# Patient Record
Sex: FEMALE | Race: WHITE | NOT HISPANIC OR LATINO | URBAN - METROPOLITAN AREA
[De-identification: names, ages, dates, MRNs, and addresses within clinical notes are randomized per-mention and may not be internally consistent; named-entity substitution may affect disease eponyms.]

---

## 2017-05-03 ENCOUNTER — EMERGENCY (EMERGENCY)
Facility: HOSPITAL | Age: 37
LOS: 1 days | Discharge: HOME | End: 2017-05-03
Admitting: EMERGENCY MEDICINE
Payer: MEDICAID

## 2017-05-03 VITALS
OXYGEN SATURATION: 100 % | HEART RATE: 102 BPM | RESPIRATION RATE: 16 BRPM | SYSTOLIC BLOOD PRESSURE: 109 MMHG | TEMPERATURE: 98 F | DIASTOLIC BLOOD PRESSURE: 70 MMHG

## 2017-05-03 DIAGNOSIS — Z98.891 HISTORY OF UTERINE SCAR FROM PREVIOUS SURGERY: Chronic | ICD-10-CM

## 2017-05-03 PROCEDURE — 73564 X-RAY EXAM KNEE 4 OR MORE: CPT | Mod: 26,RT

## 2017-05-03 PROCEDURE — 99285 EMERGENCY DEPT VISIT HI MDM: CPT

## 2017-05-03 NOTE — ED PROVIDER NOTE - PLAN OF CARE
Rest, Ice, Elevate and use ace wrap on affected extremity.  Percocet 1 tablet every 6 hrs as needed for breakthrough discomfort- caution drowsiness while taking this medication- do not drive or operate heavy machinery. Take Ibuprofen 600mg every 6 hours for mild-moderate pain as needed.  Follow up with orthopedist within 1 week for further evaluation - refer to list provided.  Follow up with your primary care physician within 1 week for post hospital visit.  Return to the Emergency Department for any new, worsening or concerning symptoms. Rest, Ice, Elevate and use ace wrap on affected extremity. Avoid strenuous activity/exercise.  Percocet 1 tablet every 6 hrs as needed for breakthrough discomfort- caution drowsiness while taking this medication- do not drive or operate heavy machinery. Take Tylenol 650mg every 4 hours for mild-moderate pain as needed.  Follow up with orthopedist within 1 week for further evaluation - refer to list provided.  Follow up with your primary care physician within 1 week for post hospital visit.  Return to the Emergency Department for any new, worsening or concerning symptoms.

## 2017-05-03 NOTE — ED PROVIDER NOTE - CARE PLAN
Principal Discharge DX:	Knee pain  Instructions for follow-up, activity and diet:	Rest, Ice, Elevate and use ace wrap on affected extremity.  Percocet 1 tablet every 6 hrs as needed for breakthrough discomfort- caution drowsiness while taking this medication- do not drive or operate heavy machinery. Take Ibuprofen 600mg every 6 hours for mild-moderate pain as needed.  Follow up with orthopedist within 1 week for further evaluation - refer to list provided.  Follow up with your primary care physician within 1 week for post hospital visit.  Return to the Emergency Department for any new, worsening or concerning symptoms. Principal Discharge DX:	Knee pain  Instructions for follow-up, activity and diet:	Rest, Ice, Elevate and use ace wrap on affected extremity. Avoid strenuous activity/exercise.  Percocet 1 tablet every 6 hrs as needed for breakthrough discomfort- caution drowsiness while taking this medication- do not drive or operate heavy machinery. Take Tylenol 650mg every 4 hours for mild-moderate pain as needed.  Follow up with orthopedist within 1 week for further evaluation - refer to list provided.  Follow up with your primary care physician within 1 week for post hospital visit.  Return to the Emergency Department for any new, worsening or concerning symptoms.

## 2017-05-03 NOTE — ED PROVIDER NOTE - OBJECTIVE STATEMENT
36 year old female with PMHx of chronic sinusitis, nasal polyps and seasonal allergies pw right sided knee pain after 2 rounds of Levaquin and high endurance exercise. Pt states that the pain in the right knee occurred 1.5 months ago and was exacerbated 2 weeks ago after exercise. Pain is on the medial anterior portion of the knee, pulling/burning in nature, 8/10 in severity, aggravated with movement. pt able to bear weight on both legs with no assistance. Denies n/v/f/c, CP, SIB, abdominal pain, urinary symptoms, numbness/tingling/weakness in extremities.

## 2017-05-03 NOTE — ED ADULT TRIAGE NOTE - CHIEF COMPLAINT QUOTE
Pt arrives w/ c/o burning sensation and pop to right knee for 2 weeks. Denies any trauma to area. Ambulatory to exercise.

## 2017-05-03 NOTE — ED PROVIDER NOTE - LOWER EXTREMITY EXAM, RIGHT
pain to palpation on the right medial/anterior  aspect of the knee, ROM within normal limits, no deformity, no ligamentous instability

## 2017-05-03 NOTE — ED PROVIDER NOTE - MEDICAL DECISION MAKING DETAILS
36 year old female with PMHx of chronic sinusitis, nasal polyps and seasonal allergies pw right sided knee pain after 2 rounds of Levaquin and high endurance exercise  Plan: xray, pain management

## 2017-05-05 ENCOUNTER — APPOINTMENT (OUTPATIENT)
Dept: ORTHOPEDIC SURGERY | Facility: CLINIC | Age: 37
End: 2017-05-05

## 2017-05-10 ENCOUNTER — APPOINTMENT (OUTPATIENT)
Dept: ORTHOPEDIC SURGERY | Facility: HOSPITAL | Age: 37
End: 2017-05-10

## 2017-05-10 ENCOUNTER — OUTPATIENT (OUTPATIENT)
Dept: OUTPATIENT SERVICES | Facility: HOSPITAL | Age: 37
LOS: 1 days | End: 2017-05-10

## 2017-05-10 VITALS
SYSTOLIC BLOOD PRESSURE: 101 MMHG | HEIGHT: 63 IN | WEIGHT: 293 LBS | BODY MASS INDEX: 51.91 KG/M2 | DIASTOLIC BLOOD PRESSURE: 69 MMHG | HEART RATE: 74 BPM

## 2017-05-10 DIAGNOSIS — Z98.891 HISTORY OF UTERINE SCAR FROM PREVIOUS SURGERY: Chronic | ICD-10-CM

## 2017-05-10 DIAGNOSIS — M25.561 PAIN IN RIGHT KNEE: ICD-10-CM

## 2017-05-11 DIAGNOSIS — M22.41 CHONDROMALACIA PATELLAE, RIGHT KNEE: ICD-10-CM

## 2017-06-20 ENCOUNTER — RESULT REVIEW (OUTPATIENT)
Age: 37
End: 2017-06-20

## 2018-12-18 ENCOUNTER — EMERGENCY (EMERGENCY)
Facility: HOSPITAL | Age: 38
LOS: 1 days | Discharge: HOME | End: 2018-12-18
Attending: EMERGENCY MEDICINE
Payer: MEDICAID

## 2018-12-18 VITALS
RESPIRATION RATE: 18 BRPM | HEART RATE: 98 BPM | TEMPERATURE: 98 F | OXYGEN SATURATION: 97 % | DIASTOLIC BLOOD PRESSURE: 78 MMHG | WEIGHT: 130.07 LBS | HEIGHT: 63 IN | SYSTOLIC BLOOD PRESSURE: 126 MMHG

## 2018-12-18 DIAGNOSIS — Z98.891 HISTORY OF UTERINE SCAR FROM PREVIOUS SURGERY: Chronic | ICD-10-CM

## 2018-12-18 PROCEDURE — 90471 IMMUNIZATION ADMIN: CPT

## 2018-12-18 PROCEDURE — 99283 EMERGENCY DEPT VISIT LOW MDM: CPT | Mod: 25

## 2018-12-18 PROCEDURE — 90715 TDAP VACCINE 7 YRS/> IM: CPT

## 2018-12-18 PROCEDURE — 99284 EMERGENCY DEPT VISIT MOD MDM: CPT

## 2018-12-18 RX ORDER — ACETAMINOPHEN 500 MG
975 TABLET ORAL ONCE
Qty: 0 | Refills: 0 | Status: COMPLETED | OUTPATIENT
Start: 2018-12-18 | End: 2018-12-18

## 2018-12-18 RX ORDER — TETANUS TOXOID, REDUCED DIPHTHERIA TOXOID AND ACELLULAR PERTUSSIS VACCINE, ADSORBED 5; 2.5; 8; 8; 2.5 [IU]/.5ML; [IU]/.5ML; UG/.5ML; UG/.5ML; UG/.5ML
0.5 SUSPENSION INTRAMUSCULAR ONCE
Qty: 0 | Refills: 0 | Status: COMPLETED | OUTPATIENT
Start: 2018-12-18 | End: 2018-12-18

## 2018-12-18 RX ADMIN — Medication 975 MILLIGRAM(S): at 22:48

## 2018-12-18 RX ADMIN — TETANUS TOXOID, REDUCED DIPHTHERIA TOXOID AND ACELLULAR PERTUSSIS VACCINE, ADSORBED 0.5 MILLILITER(S): 5; 2.5; 8; 8; 2.5 SUSPENSION INTRAMUSCULAR at 22:48

## 2018-12-18 NOTE — ED PROVIDER NOTE - NSFOLLOWUPINSTRUCTIONS_ED_ALL_ED_FT
Rest, increase activity as tolerated- no gym or working out  for at least one week  Eat lightly- avoid alcohol or sedatives  REturn to the ER for any concerns  Take tylenol for pain as needed every 6 hrs  Follow up with your primary care provider

## 2018-12-18 NOTE — ED PROVIDER NOTE - MEDICAL DECISION MAKING DETAILS
s/p head injury while working out at the gum- 30 lb wght fell on head- no  loc no imaging warranted - tdap given  for puncture wound to head- tylenol given

## 2018-12-18 NOTE — ED PROVIDER NOTE - ATTENDING CONTRIBUTION TO CARE
I performed a history and physical exam of the patient and discussed their management with the Advanced Care Practitioner. I reviewed the ACP's note and agree with the documented findings and plan of care, except if noted below. My medical decision making and observations are found below.    39 yo female sp head injury with barbell. Small scalp abrasion. Neuro intact. Tdap, wound care, dc home.

## 2018-12-18 NOTE — ED ADULT NURSE NOTE - OBJECTIVE STATEMENT
Pt presents with head injury, struck head on bar at gym, AXOX3, no LOC, reporting head pain, not on blood thinners.

## 2018-12-18 NOTE — ED PROVIDER NOTE - OBJECTIVE STATEMENT
39 yo female accompanied by her sister presents to the ER for 39 yo female accompanied by her sister presents to the ER for evaluation of head injury. Pt states "I was at the gym working out doing chest presses lying down when another person was doing the same behind me when her 30lb weight fell on my head. I noticed right after that my head was bleeding from a small cut on my head. Mt tetanus is also not up to date and I need it".  Pt denies LOC. Pt reports moderate throbbing headache with some mild nausea.  Pt c/o left sided neck pain with no midline tenderness.

## 2018-12-19 PROBLEM — J32.9 CHRONIC SINUSITIS, UNSPECIFIED: Chronic | Status: ACTIVE | Noted: 2017-05-03

## 2018-12-19 PROBLEM — J33.9 NASAL POLYP, UNSPECIFIED: Chronic | Status: ACTIVE | Noted: 2017-05-03

## 2020-01-10 ENCOUNTER — NON-APPOINTMENT (OUTPATIENT)
Age: 40
End: 2020-01-10

## 2020-01-10 ENCOUNTER — APPOINTMENT (OUTPATIENT)
Dept: PEDIATRIC ALLERGY IMMUNOLOGY | Facility: CLINIC | Age: 40
End: 2020-01-10
Payer: MEDICAID

## 2020-01-10 VITALS
HEART RATE: 98 BPM | DIASTOLIC BLOOD PRESSURE: 66 MMHG | SYSTOLIC BLOOD PRESSURE: 101 MMHG | OXYGEN SATURATION: 97 % | WEIGHT: 128.99 LBS | HEIGHT: 62.99 IN | BODY MASS INDEX: 22.86 KG/M2

## 2020-01-10 DIAGNOSIS — J45.909 UNSPECIFIED ASTHMA, UNCOMPLICATED: ICD-10-CM

## 2020-01-10 DIAGNOSIS — Z78.9 OTHER SPECIFIED HEALTH STATUS: ICD-10-CM

## 2020-01-10 DIAGNOSIS — J33.9 UNSPECIFIED ASTHMA, UNCOMPLICATED: ICD-10-CM

## 2020-01-10 DIAGNOSIS — Z88.6 UNSPECIFIED ASTHMA, UNCOMPLICATED: ICD-10-CM

## 2020-01-10 DIAGNOSIS — J30.9 ALLERGIC RHINITIS, UNSPECIFIED: ICD-10-CM

## 2020-01-10 DIAGNOSIS — Z87.09 PERSONAL HISTORY OF OTHER DISEASES OF THE RESPIRATORY SYSTEM: ICD-10-CM

## 2020-01-10 PROCEDURE — 94060 EVALUATION OF WHEEZING: CPT | Mod: 59

## 2020-01-10 PROCEDURE — 95004 PERQ TESTS W/ALRGNC XTRCS: CPT

## 2020-01-10 PROCEDURE — 99204 OFFICE O/P NEW MOD 45 MIN: CPT | Mod: 25

## 2020-01-10 PROCEDURE — 36415 COLL VENOUS BLD VENIPUNCTURE: CPT

## 2020-01-10 RX ORDER — EPINEPHRINE 0.3 MG/.3ML
0.3 INJECTION INTRAMUSCULAR
Qty: 1 | Refills: 1 | Status: ACTIVE | COMMUNITY
Start: 2020-01-10 | End: 1900-01-01

## 2020-01-10 RX ORDER — MONTELUKAST SODIUM 10 MG/1
10 TABLET, FILM COATED ORAL
Refills: 0 | Status: ACTIVE | COMMUNITY

## 2020-01-10 RX ORDER — FLUTICASONE PROPIONATE 50 MCG
50 SPRAY, SUSPENSION NASAL
Refills: 0 | Status: ACTIVE | COMMUNITY

## 2020-01-12 LAB
BASOPHILS # BLD AUTO: 0.05 K/UL
BASOPHILS NFR BLD AUTO: 0.5 %
EOSINOPHIL # BLD AUTO: 0.95 K/UL
EOSINOPHIL NFR BLD AUTO: 10.2 %
HCT VFR BLD CALC: 41.1 %
HGB BLD-MCNC: 13.1 G/DL
IMM GRANULOCYTES NFR BLD AUTO: 0.3 %
LYMPHOCYTES # BLD AUTO: 2.89 K/UL
LYMPHOCYTES NFR BLD AUTO: 31 %
MAN DIFF?: NORMAL
MCHC RBC-ENTMCNC: 29.1 PG
MCHC RBC-ENTMCNC: 31.9 GM/DL
MCV RBC AUTO: 91.3 FL
MONOCYTES # BLD AUTO: 0.66 K/UL
MONOCYTES NFR BLD AUTO: 7.1 %
NEUTROPHILS # BLD AUTO: 4.75 K/UL
NEUTROPHILS NFR BLD AUTO: 50.9 %
PLATELET # BLD AUTO: 296 K/UL
RBC # BLD: 4.5 M/UL
RBC # FLD: 14.3 %
WBC # FLD AUTO: 9.33 K/UL

## 2020-01-14 NOTE — HISTORY OF PRESENT ILLNESS
[Eczematous rashes] : eczematous rashes [de-identified] : 39 year old female with a diagnosis of [place]'s [place] here for initial visit and allergy testing. \par \par Allergies/ Allergic Rhinitis/ Sinusitis: \par Patient (Pt) has had nasal congestion, rhinorrhea and sneezing for years, could not quantify how many. Symptoms occur through out the year. She is currently taking Flonase 2 sprays daily and nasal saline washes infrequently. She is using Zyrtec daily for 1 year.  She stopped on 1/5/2020 for skin testing today. Pt's last allergy testing was 5 years ago where she was found to be allergic to cats, dogs, dust mites and rodents. Other reactions include to wine where she develops nasal swelling, itchiness and eye tearing. \par \par Associated with this, when she has had milk for the last 5 years, she has lip and throat tingling and itching. She has no other food allergies that she can recall. She has followed with multiple A&I specialists, her last follow up was with Dr. Denise in [place] 1 year ago. She previously was getting allergy shots for 1 year which did not help. Patient gets frequent sinus and ear infections at least 1x/month for the past 3 years requiring multiple antibiotic courses for each episode. \par \par Nasal Polyps:\par She has had nasal polyps since 24 years old with 4 prior surgeries, last one being in June 2018. She previously followed with Dr. Talbot (ENT in [place]) and recently switched to Dr. Douglas (ENT in [place]). Has an appointment of 1/22 where she has a hearing test for possible ear tubes, and evidence of some hearing loss. She has been told that she has chronic fluid in both ears.  \par \par Aspirin Intolerance: \par Diagnosed at 13 years old, Ms. Lancaster took some aspirin and immediately afterwards, developed hives and throat swelling which was treated with antihistamines.  She has avoided aspirin since then.  \par \par Asthma: \par Diagnosed at 10 year ago. Symptoms include wheezing, SOB, cough which she gets daily. She does not have albuterol at home. Uses Symbicort daily however sometimes misses doses. Her asthma triggers include allergies, cold, change in weather. She has never seen a pulmonologist. Her PCP Dr. Marie recently prescribed singulair and symbicort which she has not picked up yet. \par  \par She has no carpets, pets, no smokers in the home. \par \par Medications: \par Zyrtec daily\par Flonase daily\par Symbicort daily (non-compliant with dosing) [Cough] : cough [> or = 2 days/wk] : > than or = 2 days/wk [Minor Limitation] : minor limitation [< or = 15] : < than or = 15  [Daily] : daily

## 2020-01-14 NOTE — IMPRESSION
[Allergy Testing Trees] : trees [Allergy Testing Dust Mite] : dust mites [Allergy Testing Grasses] : grasses [Allergy Testing Cockroach] : cockroach [Allergy Testing Mixed Feathers] : feathers [] : molds [Allergy Testing Cat] : cat [Allergy Testing Dog] : dog [Spirometry] : Spirometry [Moderate] : (moderate) [Reversible] : , without reversibility. [Allergy Testing Weeds] : weeds

## 2020-01-14 NOTE — PHYSICAL EXAM
[Alert] : alert [Well Nourished] : well nourished [Healthy Appearance] : healthy appearance [No Acute Distress] : no acute distress [Well Developed] : well developed [Normal Pupil & Iris Size/Symmetry] : normal pupil and iris size and symmetry [No Discharge] : no discharge [No Photophobia] : no photophobia [Sclera Not Icteric] : sclera not icteric [Normal Lips/Tongue] : the lips and tongue were normal [Normal Outer Ear/Nose] : the ears and nose were normal in appearance [No Thrush] : no thrush [Normal Tonsils] : normal tonsils [Supple] : the neck was supple [Normal Rate and Effort] : normal respiratory rhythm and effort [No Crackles] : no crackles [No Retractions] : no retractions [Bilateral Audible Breath Sounds] : bilateral audible breath sounds [Normal Rate] : heart rate was normal  [Normal S1, S2] : normal S1 and S2 [No murmur] : no murmur [Regular Rhythm] : with a regular rhythm [Soft] : abdomen soft [Not Distended] : not distended [Normal Cervical Lymph Nodes] : cervical [Skin Intact] : skin intact  [No Rash] : no rash [No Skin Lesions] : no skin lesions [No clubbing] : no clubbing [No Edema] : no edema [No Cyanosis] : no cyanosis [Normal Mood] : mood was normal [Normal Affect] : affect was normal [Alert, Awake, Oriented as Age-Appropriate] : alert, awake, oriented as age appropriate [Boggy Nasal Turbinates] : boggy and/or pale nasal turbinates [Wheezing] : no wheezing was heard [Eczematous Patches] : no eczematous patches [de-identified] : thin

## 2020-01-14 NOTE — REVIEW OF SYSTEMS
[Eye Itching] : itchy eyes [Nasal Congestion] : nasal congestion [Rhinorrhea] : rhinorrhea [Nasal Itching] : nasal itching [Sneezing] : sneezing [Difficulty Breathing] : dyspnea [Cough] : cough [Wheezing Worsens With Exercise] : wheezing worsens with exercise [Wheezing Worse During Cold Weather] : wheezing worse during cold weather [Wheezing] : wheezing [Fever] : no fever [Vomiting] : no vomiting [Diarrhea] : no diarrhea [Urticaria] : no urticaria [Swelling] : no swelling

## 2020-01-14 NOTE — REASON FOR VISIT
[Initial Evaluation] : an initial evaluation of [To Medication] : allergy to medication [Asthma] : asthma [Hives] : hives

## 2020-01-14 NOTE — CONSULT LETTER
[Dear  ___] : Dear  [unfilled], [Please see my note below.] : Please see my note below. [Consult Letter:] : I had the pleasure of evaluating your patient, [unfilled]. [Consult Closing:] : Thank you very much for allowing me to participate in the care of this patient.  If you have any questions, please do not hesitate to contact me. [Sincerely,] : Sincerely, [FreeTextEntry2] : Ra Hartman MD [FreeTextEntry3] : Kayleen Mcgrath MD, FAAAAI, FACJOANNI\par Associate , \par Assistant Fellowship Training ,\par Director, [facility]\par Division of Allergy and Immunology\par [facility]\par [facility]\par , Pediatrics and Medicine\par [facility]\par [address], [address]\[place] [address]\par [phone]\par

## 2020-01-16 LAB — TOTAL IGE SMQN RAST: 271 KU/L

## 2020-01-17 LAB — LEUKOTRIENE E4, URINE: 613 CD:455662145

## 2020-01-29 ENCOUNTER — LABORATORY RESULT (OUTPATIENT)
Age: 40
End: 2020-01-29

## 2020-01-29 ENCOUNTER — APPOINTMENT (OUTPATIENT)
Dept: PEDIATRIC ALLERGY IMMUNOLOGY | Facility: CLINIC | Age: 40
End: 2020-01-29
Payer: MEDICAID

## 2020-01-29 VITALS
HEART RATE: 75 BPM | HEIGHT: 62.99 IN | BODY MASS INDEX: 22.68 KG/M2 | SYSTOLIC BLOOD PRESSURE: 107 MMHG | DIASTOLIC BLOOD PRESSURE: 71 MMHG | WEIGHT: 128 LBS | OXYGEN SATURATION: 97 %

## 2020-01-29 DIAGNOSIS — Z88.6 ALLERGY STATUS TO ANALGESIC AGENT: ICD-10-CM

## 2020-01-29 DIAGNOSIS — Z13.228 ENCOUNTER FOR SCREENING FOR OTHER SUSPECTED ENDOCRINE DISORDER: ICD-10-CM

## 2020-01-29 DIAGNOSIS — J32.9 CHRONIC SINUSITIS, UNSPECIFIED: ICD-10-CM

## 2020-01-29 DIAGNOSIS — J33.9 CHRONIC SINUSITIS, UNSPECIFIED: ICD-10-CM

## 2020-01-29 DIAGNOSIS — Z13.29 ENCOUNTER FOR SCREENING FOR OTHER SUSPECTED ENDOCRINE DISORDER: ICD-10-CM

## 2020-01-29 DIAGNOSIS — Z91.011 ALLERGY TO MILK PRODUCTS: ICD-10-CM

## 2020-01-29 DIAGNOSIS — Z13.0 ENCOUNTER FOR SCREENING FOR OTHER SUSPECTED ENDOCRINE DISORDER: ICD-10-CM

## 2020-01-29 PROCEDURE — 99214 OFFICE O/P EST MOD 30 MIN: CPT | Mod: 25

## 2020-01-29 PROCEDURE — 95012 NITRIC OXIDE EXP GAS DETER: CPT

## 2020-01-29 PROCEDURE — 36415 COLL VENOUS BLD VENIPUNCTURE: CPT

## 2020-01-29 PROCEDURE — 99204 OFFICE O/P NEW MOD 45 MIN: CPT | Mod: 25

## 2020-01-30 LAB
ALBUMIN SERPL ELPH-MCNC: 4.5 G/DL
ALP BLD-CCNC: 46 U/L
ALT SERPL-CCNC: 13 U/L
ANION GAP SERPL CALC-SCNC: 14 MMOL/L
AST SERPL-CCNC: 20 U/L
BILIRUB SERPL-MCNC: 0.8 MG/DL
BUN SERPL-MCNC: 14 MG/DL
CALCIUM SERPL-MCNC: 10 MG/DL
CH50 SERPL-MCNC: 63 U/ML
CHLORIDE SERPL-SCNC: 102 MMOL/L
CO2 SERPL-SCNC: 25 MMOL/L
CREAT SERPL-MCNC: 0.79 MG/DL
DEPRECATED KAPPA LC FREE/LAMBDA SER: 0.88 RATIO
GLUCOSE SERPL-MCNC: 78 MG/DL
IGA SER QL IEP: 360 MG/DL
IGG SER QL IEP: 1397 MG/DL
IGM SER QL IEP: 128 MG/DL
KAPPA LC CSF-MCNC: 1.96 MG/DL
KAPPA LC SERPL-MCNC: 1.73 MG/DL
POTASSIUM SERPL-SCNC: 4.4 MMOL/L
PROT SERPL-MCNC: 7.1 G/DL
SODIUM SERPL-SCNC: 141 MMOL/L

## 2020-01-30 NOTE — CONSULT LETTER
[Dear  ___] : Dear  [unfilled], [Consult Letter:] : I had the pleasure of evaluating your patient, [unfilled]. [Please see my note below.] : Please see my note below. [Consult Closing:] : Thank you very much for allowing me to participate in the care of this patient.  If you have any questions, please do not hesitate to contact me. [FreeTextEntry3] : Marah Nino MD\par \par MD AVEL Francois, [place]\par Adult and Pediatric Allergy, Asthma and Clinical Immunology\par  of Medicine and Pediatrics at\par   [facility]\par Section Head, Adult Allergy and Immunology\par   [facility]\par   Division of Allergy, Asthma and Immunology\par   [address]\par   [place], [place] [address]\par   Phone [phone]  Fax [phone]\par \par \par  [Sincerely,] : Sincerely, [DrElla  ___] : Dr. HOLLOWAY

## 2020-01-30 NOTE — HISTORY OF PRESENT ILLNESS
[Eczematous rashes] : eczematous rashes [de-identified] : 39 year old female with Aspirin Exacerbated Respiratory Disease (AERD) [facility]  for evaluation of aspirin desensitization therapy\par \par Chronic Rhinosinusitis with Nasal Polyps/ Allergic Rhinitis\par Nasal congestion, rhinorrhea and sneezing for years. Symptoms occur through out the year.\par Skin testing by Dr Mcgrath 1/2020- positive to tree and grass.  . Takes Zyrtec daily, at last visit Flonase increased to 2 sprays BID with improvement in congestion.\par \par She has had nasal polyps since 23-24 years old with 4 prior surgeries, last one in June 2018. She previously followed with Dr. Talbot (ENT in [place]) and recently switched to Dr. Loya (ENT in [place]). Seen 3 weeks ago and reportedly only minimal nasal polyps, no surgical intervention needed. Rarely uses nasal saline washes, never with budesonide before. Has had 5-6 courses of steroids each approximately 3 weeks long over the last year, improves symptoms and sense of smell temporarily. \par \par Reactions to alcohol  (wine, vodka) where she develops nasal swelling, itchiness and eye tearing.\par \par Has appointment on 2/12 for evaluation of chronic b/l fluid in ears and evaluation for myringotomy in the setting of some hearing loss.\par \par Aspirin Intolerance: \par At age 12-13, took aspirin and immediately afterwards, developed hives, throat swelling, and shortness of breath which was treated with antihistamines. She has avoided aspirin and NSAIDs since.\par \par Asthma: \par Diagnosed at 10 year ago. Symptoms include wheezing, SOB, cough. Symptoms have improved since last visit, when she started using Symbicort 2 puffs BID and montelukast 10 mg daily regularly. Needs albuterol 2-3x weekly. Her asthma triggers include allergies, cold, change in weather. She has never seen a pulmonologist. ACT 16 today.\par \par Milk allergy:\par Associated with this, when she has had milk for the last 5 years, she has lip and throat tingling and itching. No other food allergies. Previously followed with multiple A&I specialists. Previously received immunotherapy for 1 year which did not help. Patient gets frequent sinus and ear infections at least 1x/month for the past 3 years requiring multiple antibiotic courses for each episode.  [16 - 19] : 16 - 19 [FreeTextEntry7] : 16

## 2020-01-30 NOTE — PHYSICAL EXAM
[Alert] : alert [Well Nourished] : well nourished [Healthy Appearance] : healthy appearance [No Acute Distress] : no acute distress [Well Developed] : well developed [Normal Pupil & Iris Size/Symmetry] : normal pupil and iris size and symmetry [No Discharge] : no discharge [No Photophobia] : no photophobia [Sclera Not Icteric] : sclera not icteric [Normal Nasal Mucosa] : the nasal mucosa was normal [Normal Outer Ear/Nose] : the ears and nose were normal in appearance [Normal Lips/Tongue] : the lips and tongue were normal [Normal Tonsils] : normal tonsils [No Thrush] : no thrush [Normal Dentition] : normal dentition [No Oral Lesions or Ulcers] : no oral lesions or ulcers [Supple] : the neck was supple [Normal Rate and Effort] : normal respiratory rhythm and effort [Normal Palpation] : palpation of the chest revealed no abnormalities [No Crackles] : no crackles [No Retractions] : no retractions [Bilateral Audible Breath Sounds] : bilateral audible breath sounds [Normal S1, S2] : normal S1 and S2 [Normal Rate] : heart rate was normal  [No murmur] : no murmur [Regular Rhythm] : with a regular rhythm [Soft] : abdomen soft [Not Tender] : non-tender [Not Distended] : not distended [No HSM] : no hepato-splenomegaly [Normal Cervical Lymph Nodes] : cervical [Normal Axillary Lumph Nodes] : axillary [Skin Intact] : skin intact  [No Rash] : no rash [No Joint Swelling or Erythema] : no joint swelling or erythema [No clubbing] : no clubbing [No Edema] : no edema [No Cyanosis] : no cyanosis [Normal Affect] : affect was normal [Normal Mood] : mood was normal [Alert, Awake, Oriented as Age-Appropriate] : alert, awake, oriented as age appropriate [No Neck Mass] : no neck mass was observed [No LAD] : no lymphadenopathy [Wheezing] : no wheezing was heard [Eczematous Patches] : no eczematous patches [de-identified] : b/l TM with effusions, no erythema or bulging

## 2020-01-30 NOTE — REVIEW OF SYSTEMS
[Rhinorrhea] : rhinorrhea [Nasal Congestion] : nasal congestion [Post Nasal Drip] : post nasal drip [Difficulty Breathing] : dyspnea [Cough] : cough [Wheezing Worsens With Exercise] : wheezing worsens with exercise [Wheezing Worse During Cold Weather] : wheezing worse during cold weather [Nl] : Endocrine [Fever] : no fever [Eye Discharge] : no eye discharge [Eye Redness] : no redness [Sore Throat] : no sore throat [Throat Itching] : no throat itching [Edema] : no edema [Chest Pain] : no chest pain or discomfort [Palpitations] : no palpitations [SOB at Rest] : no shortness of breath at rest [Nocturnal Awakening] : no nocturnal awakening with shortness of breath [Vomiting] : no vomiting [Diarrhea] : no diarrhea [Abdominal Pain] : no abdominal pain [Easy Bruising] : no tendency for easy bruising [Easy Bleeding] : no ~M tendency for easy bleeding

## 2020-01-31 LAB
CASEIN IGE QN: 0.12 KUA/L
COW MILK IGE QN: 0.91 KUA/L
DEPRECATED CASEIN IGE RAST QL: NORMAL
DEPRECATED COW MILK IGE RAST QL: 2
TRYPTASE: 5.2 NG/ML

## 2020-02-07 LAB
ACE BLD-CCNC: 34 U/L
COMPLEMENT, ALTERNATE PATHWAY (AH50): 76
MANNAN BINDING LECTIN (MBL): 738 NG/ML
MPO AB + PR3 PNL SER: NORMAL

## 2020-02-13 ENCOUNTER — APPOINTMENT (OUTPATIENT)
Dept: PEDIATRIC ALLERGY IMMUNOLOGY | Facility: CLINIC | Age: 40
End: 2020-02-13

## 2020-02-24 ENCOUNTER — APPOINTMENT (OUTPATIENT)
Dept: PEDIATRIC ALLERGY IMMUNOLOGY | Facility: CLINIC | Age: 40
End: 2020-02-24

## 2020-02-27 ENCOUNTER — APPOINTMENT (OUTPATIENT)
Dept: OBGYN | Facility: CLINIC | Age: 40
End: 2020-02-27

## 2020-11-16 ENCOUNTER — APPOINTMENT (OUTPATIENT)
Dept: OBGYN | Facility: CLINIC | Age: 40
End: 2020-11-16
Payer: MEDICAID

## 2020-11-16 PROCEDURE — 99386 PREV VISIT NEW AGE 40-64: CPT

## 2020-11-30 ENCOUNTER — APPOINTMENT (OUTPATIENT)
Dept: ANTEPARTUM | Facility: CLINIC | Age: 40
End: 2020-11-30
Payer: MEDICAID

## 2020-11-30 PROCEDURE — 99072 ADDL SUPL MATRL&STAF TM PHE: CPT

## 2020-11-30 PROCEDURE — 76801 OB US < 14 WKS SINGLE FETUS: CPT

## 2020-12-11 ENCOUNTER — APPOINTMENT (OUTPATIENT)
Dept: OBGYN | Facility: CLINIC | Age: 40
End: 2020-12-11

## 2021-04-28 ENCOUNTER — NON-APPOINTMENT (OUTPATIENT)
Age: 41
End: 2021-04-28

## 2021-04-28 ENCOUNTER — APPOINTMENT (OUTPATIENT)
Dept: PEDIATRIC ALLERGY IMMUNOLOGY | Facility: CLINIC | Age: 41
End: 2021-04-28
Payer: MEDICAID

## 2021-04-28 VITALS
HEIGHT: 62.99 IN | SYSTOLIC BLOOD PRESSURE: 107 MMHG | OXYGEN SATURATION: 96 % | DIASTOLIC BLOOD PRESSURE: 64 MMHG | WEIGHT: 195.5 LBS | HEART RATE: 95 BPM | TEMPERATURE: 96.2 F | BODY MASS INDEX: 34.64 KG/M2

## 2021-04-28 DIAGNOSIS — Z88.6 UNSPECIFIED ASTHMA, UNCOMPLICATED: ICD-10-CM

## 2021-04-28 DIAGNOSIS — J33.9 UNSPECIFIED ASTHMA, UNCOMPLICATED: ICD-10-CM

## 2021-04-28 DIAGNOSIS — J45.909 UNSPECIFIED ASTHMA, UNCOMPLICATED: ICD-10-CM

## 2021-04-28 DIAGNOSIS — J45.901 UNSPECIFIED ASTHMA WITH (ACUTE) EXACERBATION: ICD-10-CM

## 2021-04-28 PROCEDURE — 99214 OFFICE O/P EST MOD 30 MIN: CPT | Mod: 25

## 2021-04-28 PROCEDURE — 94060 EVALUATION OF WHEEZING: CPT

## 2021-04-28 PROCEDURE — 99072 ADDL SUPL MATRL&STAF TM PHE: CPT

## 2021-04-28 RX ORDER — CETIRIZINE HYDROCHLORIDE 10 MG/1
10 TABLET, FILM COATED ORAL DAILY
Qty: 30 | Refills: 0 | Status: ACTIVE | COMMUNITY
Start: 2020-01-10 | End: 1900-01-01

## 2021-04-28 RX ORDER — ALBUTEROL SULFATE 90 UG/1
108 (90 BASE) AEROSOL, METERED RESPIRATORY (INHALATION)
Qty: 1 | Refills: 1 | Status: ACTIVE | COMMUNITY
Start: 2020-01-10 | End: 1900-01-01

## 2021-04-28 RX ORDER — BUDESONIDE AND FORMOTEROL FUMARATE DIHYDRATE 160; 4.5 UG/1; UG/1
160-4.5 AEROSOL RESPIRATORY (INHALATION) TWICE DAILY
Qty: 1 | Refills: 2 | Status: ACTIVE | COMMUNITY
Start: 1900-01-01 | End: 1900-01-01

## 2021-04-28 RX ORDER — PREDNISONE 20 MG/1
20 TABLET ORAL
Qty: 8 | Refills: 0 | Status: ACTIVE | COMMUNITY
Start: 2021-04-28 | End: 1900-01-01

## 2021-05-03 PROBLEM — J45.909 ASPIRIN-SENSITIVE ASTHMA WITH NASAL POLYPS: Status: ACTIVE | Noted: 2020-01-10

## 2021-05-03 NOTE — HISTORY OF PRESENT ILLNESS
[de-identified] : 40 year old female with AERD, asthma that has been particularly bothersome, allergic rhinitis, food allergy, who has had recurrent shortness of breath for the last two weeks, and has been on oral antibiotics for sinusitis in the last few months.  The shortness of breath has led to use of albuterol every day, and nebulized albuterol 3 times this week as well.  There is associated nasal congestion and sinus pressure.  Sleeping is difficult with waking up at night almost every night. There is exercise induced shortness of breath as well. \par \par Ms. Marte has been treated with oral antibiotics and steroids in the last month for sinus infections.

## 2021-05-03 NOTE — PHYSICAL EXAM
[Alert] : alert [Well Nourished] : well nourished [No Acute Distress] : no acute distress [Well Developed] : well developed [No Discharge] : no discharge [Conjunctival Erythema] : no conjunctival erythema [Normal Outer Ear/Nose] : the ears and nose were normal in appearance [No Nasal Discharge] : no nasal discharge [No Thrush] : no thrush [Supple] : the neck was supple [Normal Rate and Effort] : normal respiratory rhythm and effort [No Crackles] : no crackles [Wheezing] : no wheezing was heard [Normal Rate] : heart rate was normal  [Normal S1, S2] : normal S1 and S2 [No murmur] : no murmur [Regular Rhythm] : with a regular rhythm [Normal Mood] : mood was normal [Alert, Awake, Oriented as Age-Appropriate] : alert, awake, oriented as age appropriate [de-identified] : g [de-identified] : gravid abdomen

## 2021-05-03 NOTE — CONSULT LETTER
[Dear  ___] : Dear  [unfilled], [Courtesy Letter:] : I had the pleasure of seeing your patient, [unfilled], in my office today. [Please see my note below.] : Please see my note below. [Consult Closing:] : Thank you very much for allowing me to participate in the care of this patient.  If you have any questions, please do not hesitate to contact me. [Sincerely,] : Sincerely, [FreeTextEntry2] : Ra Hartman MD [FreeTextEntry3] : Kayleen Mcgrath MD, FAAAAI, FACJOANNI\par Associate , \par Assistant Fellowship Training ,\par Director, [facility] and [facility]\par Division of Allergy and Immunology\par [facility]\par [facility]\par , Pediatrics and Medicine\par [facility]\par [address], [address]\[place] [address]\par [phone]\par

## 2022-08-17 ENCOUNTER — EMERGENCY (EMERGENCY)
Facility: HOSPITAL | Age: 42
LOS: 1 days | Discharge: HOME | End: 2022-08-17
Attending: EMERGENCY MEDICINE | Admitting: EMERGENCY MEDICINE

## 2022-08-17 VITALS
HEART RATE: 83 BPM | SYSTOLIC BLOOD PRESSURE: 113 MMHG | HEIGHT: 63 IN | DIASTOLIC BLOOD PRESSURE: 60 MMHG | RESPIRATION RATE: 17 BRPM | TEMPERATURE: 97 F | OXYGEN SATURATION: 100 %

## 2022-08-17 DIAGNOSIS — Z98.891 HISTORY OF UTERINE SCAR FROM PREVIOUS SURGERY: Chronic | ICD-10-CM

## 2022-08-17 PROCEDURE — 99284 EMERGENCY DEPT VISIT MOD MDM: CPT | Mod: 25

## 2022-08-17 PROCEDURE — 93010 ELECTROCARDIOGRAM REPORT: CPT

## 2022-08-17 RX ORDER — CYCLOBENZAPRINE HYDROCHLORIDE 10 MG/1
10 TABLET, FILM COATED ORAL ONCE
Refills: 0 | Status: COMPLETED | OUTPATIENT
Start: 2022-08-17 | End: 2022-08-17

## 2022-08-17 RX ORDER — ALBUTEROL 90 UG/1
0 AEROSOL, METERED ORAL
Qty: 0 | Refills: 0 | DISCHARGE

## 2022-08-17 RX ORDER — CYCLOBENZAPRINE HYDROCHLORIDE 10 MG/1
1 TABLET, FILM COATED ORAL
Qty: 15 | Refills: 0
Start: 2022-08-17 | End: 2022-08-21

## 2022-08-17 RX ORDER — GABAPENTIN 400 MG/1
100 CAPSULE ORAL ONCE
Refills: 0 | Status: COMPLETED | OUTPATIENT
Start: 2022-08-17 | End: 2022-08-17

## 2022-08-17 RX ORDER — GABAPENTIN 400 MG/1
1 CAPSULE ORAL
Qty: 15 | Refills: 0
Start: 2022-08-17 | End: 2022-08-21

## 2022-08-17 RX ORDER — BUDESONIDE AND FORMOTEROL FUMARATE DIHYDRATE 160; 4.5 UG/1; UG/1
0 AEROSOL RESPIRATORY (INHALATION)
Qty: 0 | Refills: 0 | DISCHARGE

## 2022-08-17 RX ADMIN — GABAPENTIN 100 MILLIGRAM(S): 400 CAPSULE ORAL at 10:43

## 2022-08-17 RX ADMIN — CYCLOBENZAPRINE HYDROCHLORIDE 10 MILLIGRAM(S): 10 TABLET, FILM COATED ORAL at 10:43

## 2022-08-17 NOTE — ED PROVIDER NOTE - NSFOLLOWUPINSTRUCTIONS_ED_ALL_ED_FT
Take medications as prescribed.  Return to ER immediately for worsening pain, any weakness or numbness, or further concern.  Follow up with Orthopedic Surgeon - referral list given.

## 2022-08-17 NOTE — ED PROVIDER NOTE - PHYSICAL EXAMINATION
ATTENDING PHYSICAL EXAM  GEN - NAD; well appearing; A+O x3  HEAD - NC/AT; EYES/NOSE - PERRL, EOMI  NECK: Neck supple, FROM, non-tender without lymphadenopathy, no masses, no JVD  PULMONARY - CTA b/l, symmetric breath sounds, no w/r/r  CARDIAC -s1s2, RRR, no M,R,G  ABDOMEN - +NABS, ND, NT, soft, no guarding, no rebound, no masses   BACK - + mild TTP throughout right thoracic area, no ecchymosis or trauma noted.  NO vert TTP or step-off.  EXTREMITIES - symmetric pulses, 2+ dp, capillary refill < 2 seconds, no clubbing, no cyanosis, no edema  SKIN - no rash or bruising   NEUROLOGIC - alert, CN 2-12 intact, right shoulder 5/5 motor function.  Right arm normal exam.  right elbow with normal biceps reflex, FROM, motor intact.  Normal wrist and hand motor function.  2+ rad pulse.  Normal hand .  Normal cap refill and distal sensation to soft touch.

## 2022-08-17 NOTE — ED PROVIDER NOTE - OBJECTIVE STATEMENT
43 y/o F c/o right thoracic back pain since Monday.  Attributes to injury upon "getting up from bed wrong".  Pain is worse with movement.  Radiation into right cervical area and down RUE to hand with tingling sensation in hand.  No decreased movement or numbness.  Pain in back with movement of RUE.  No fever, cough, recent covid.  To  yesterday for this.  Taken T#3 and used lidocaine patch with insufficient relief.  Denies chest pain or shortness of breath.  No cough.  No abd pain or other complaint.  Hx of allergy to PCN, ASA, NSAIDs with reaction of "throat closing".  LMP 8/4/22.

## 2022-08-17 NOTE — ED PROVIDER NOTE - PROGRESS NOTE DETAILS
iStop reviewed - phentermine previously prescribed. Patient re-assessed.  Feels much better post medication.  Moving comfortably.  UCG negative.  Stable for discharge.  To continue flexeril and neurontin, and to f/u with orthopedist.  Patient also requests percocet prescription in case of more severe pain.  Will prescribed for breakthrough severe pain.  I discussed risks of opioid medications including addiction.

## 2022-08-17 NOTE — ED ADULT NURSE NOTE - OBJECTIVE STATEMENT
Received pt to intake 9, A+Ox4, ambulatory. C/O right shoulder pain x 2 days, pt states got out of bed wrong. strength partially weaker on the right side, sensation equal bilaterally. Respirations even and unlabored, normal work of breathing, no accessory muscle use, speaking in full clear uninterrupted sentences. Pt denies any chest pain, SOB, headache, dizziness, N/V/D, fever, chills.   will continue to monitor.

## 2022-08-17 NOTE — ED PROVIDER NOTE - PATIENT PORTAL LINK FT
You can access the FollowMyHealth Patient Portal offered by [facility] by registering at the following website: [email or web address]. By joining [facility]’s FollowMyHealth portal, you will also be able to view your health information using other applications (apps) compatible with our system.

## 2022-08-17 NOTE — ED PROVIDER NOTE - CLINICAL SUMMARY MEDICAL DECISION MAKING FREE TEXT BOX
41 y/o F c/o right back pain with radiculopathy.  Likely musculoskeletal or peripheral nerve etiology.  No neuro deficit on exam.  No XR indicated. Unable to treat with NSAID due to allergy.  Will give flexeril for muscle relaxation and gabapentin for nerve contribution.  Discussed need for rest and follow up with specialist for consideration of further testing (MRI, EMG) or treatment (injections, PT).

## 2022-08-29 ENCOUNTER — APPOINTMENT (OUTPATIENT)
Dept: ORTHOPEDIC SURGERY | Facility: CLINIC | Age: 42
End: 2022-08-29

## 2022-08-29 NOTE — PHYSICAL EXAM
[de-identified] : 5 out of 5 motor strength,sensation is intact and symmetrical full range of motion flexion extension and rotation, no palpatory tenderness full range of motion of hips knees shoulders and elbows (all four extremities), no atrophy, negative straight leg raise, no pathological reflexes, no swelling, normal ambulation, no apparent distress skin intact, no palpable lymph nodes, no upper or lower extremity instability, alert and oriented x 3 and normal mood. Normal finger-to nose test.\par

## 2022-08-29 NOTE — HISTORY OF PRESENT ILLNESS
[de-identified] : Pt is a 42 year old female who presents to the office today for an initial evaluation of cervical pain.\par MRI done\par \par No fever, chills, sweats, nausea/vomiting. No bowel or bladder dysfunction, no recent weight loss or gain. No night pain. This history is in addition to the intake form that I personally reviewed.\par

## 2022-08-29 NOTE — DISCUSSION/SUMMARY
[de-identified] : All options discussed including rest,medicine,home exercise, acupuncture, Chiropractic care, physical therapy, pain management and last resort surgery. All questions were answered, all alternatives discussed and the patient is in complete agreement with the plan. Follow up appointment as instructed. If any issues arise, the patient will call or come in sooner.\par

## 2022-11-29 ENCOUNTER — APPOINTMENT (OUTPATIENT)
Dept: SURGERY | Facility: CLINIC | Age: 42
End: 2022-11-29
Payer: MEDICAID

## 2022-11-29 ENCOUNTER — LABORATORY RESULT (OUTPATIENT)
Age: 42
End: 2022-11-29

## 2022-11-29 VITALS — BODY MASS INDEX: 24.8 KG/M2 | WEIGHT: 140 LBS | HEIGHT: 63 IN

## 2022-11-29 DIAGNOSIS — Z78.9 OTHER SPECIFIED HEALTH STATUS: ICD-10-CM

## 2022-11-29 DIAGNOSIS — Z83.49 FAMILY HISTORY OF OTHER ENDOCRINE, NUTRITIONAL AND METABOLIC DISEASES: ICD-10-CM

## 2022-11-29 DIAGNOSIS — E04.1 NONTOXIC SINGLE THYROID NODULE: ICD-10-CM

## 2022-11-29 DIAGNOSIS — M50.90 CERVICAL DISC DISORDER, UNSPECIFIED, UNSPECIFIED CERVICAL REGION: ICD-10-CM

## 2022-11-29 PROCEDURE — 99203 OFFICE O/P NEW LOW 30 MIN: CPT

## 2022-11-29 PROCEDURE — 10005 FNA BX W/US GDN 1ST LES: CPT

## 2022-12-04 PROBLEM — M50.90 CERVICAL DISC DISEASE: Status: RESOLVED | Noted: 2022-12-04 | Resolved: 2022-12-04

## 2022-12-04 PROBLEM — Z83.49 FAMILY HISTORY OF THYROID NODULE: Status: ACTIVE | Noted: 2022-12-04

## 2022-12-04 PROBLEM — Z78.9 DENIES ALCOHOL CONSUMPTION: Status: ACTIVE | Noted: 2022-12-04

## 2022-12-04 RX ORDER — ALBUTEROL 90 MCG
AEROSOL (GRAM) INHALATION
Refills: 0 | Status: ACTIVE | COMMUNITY

## 2022-12-04 RX ORDER — BUDESONIDE 90 UG/1
90 AEROSOL, POWDER RESPIRATORY (INHALATION)
Refills: 0 | Status: ACTIVE | COMMUNITY

## 2022-12-04 RX ORDER — DUPILUMAB 200 MG/1.14ML
200 INJECTION, SOLUTION SUBCUTANEOUS
Refills: 0 | Status: ACTIVE | COMMUNITY

## 2022-12-04 NOTE — ASSESSMENT
[FreeTextEntry1] : Patient with recently noted right thyroid nodule.  I performed an ultrasound-guided fine-needle aspiration in the office today.  Please see separate procedure note.  If cytology is benign, I recommended a repeat ultrasound May 2023, RTO 6 months.  I have answered their questions to the best my ability.

## 2022-12-04 NOTE — REASON FOR VISIT
[Procedure: _________] : a [unfilled] procedure visit [Initial Consultation] : an initial consultation for [FreeTextEntry2] : Right thyroid nodule [Other: _____] : [unfilled]

## 2022-12-04 NOTE — PROCEDURE
[FreeTextEntry1] : Ultrasound-guided thyroid FNA [FreeTextEntry2] : Right thyroid nodule 2.3 cm [FreeTextEntry3] : Patient for thyroid biopsy. Risks benefits and alternatives discussed including bleeding, infection, swelling and need for repeat biopsy. Questions answered.\par Consent obtained, timeout taken.\par \par Patient supine.\par No anesthetic used. \par Nodule localized with US guidance\par Sterile technique with Betadine skin prep.\par 22-gauge needle under ultrasound guidance with a single pass.\par Slides prepared sent to cytology.\par \par Post procedure:\par Hemostasis obtained, patient tolerated well, no complications.\par Post procedure instructions given.\par

## 2022-12-04 NOTE — HISTORY OF PRESENT ILLNESS
[de-identified] : Patient referred by Dr. Jara for evaluation of right thyroid nodule.  Patient complaining of neck pain, MRI revealed cervical C–7 herniation and right thyroid nodule.  Patient denies prior history of thyroid disease, dysphagia, change in voice or radiation exposure.  MRI of cervical spine September 2022: 2.3 x 1.7 cm right lower thyroid nodule.  I have reviewed all old and new data and available images.

## 2022-12-04 NOTE — HISTORY OF PRESENT ILLNESS
[de-identified] : Patient referred by Dr. Jara for evaluation of right thyroid nodule.  Patient complaining of neck pain, MRI revealed cervical C–7 herniation and right thyroid nodule.  Patient denies prior history of thyroid disease, dysphagia, change in voice or radiation exposure.  MRI of cervical spine September 2022: 2.3 x 1.7 cm right lower thyroid nodule.  I have reviewed all old and new data and available images.

## 2022-12-04 NOTE — PHYSICAL EXAM
[de-identified] : no cervical or supraclavicular adenopathy, trachea midline, thyroid without enlargement or palpable mass [Normal] : no neck adenopathy [de-identified] : Skin:  normal appearance.  no rash, nodules, vesicles, or erythema,\par Musculoskeletal:  full range of motion and no deformities appreciated\par Neurological:  grossly intact\par Psychiatric:  oriented to person, place and time with appropriate affect

## 2022-12-04 NOTE — CONSULT LETTER
[Dear  ___] : Dear  [unfilled], [Consult Letter:] : I had the pleasure of evaluating your patient, [unfilled]. [Please see my note below.] : Please see my note below. [Consult Closing:] : Thank you very much for allowing me to participate in the care of this patient.  If you have any questions, please do not hesitate to contact me. [Sincerely,] : Sincerely, [FreeTextEntry2] : Dr. Oh Jara  [FreeTextEntry3] : Heydi Casanova MD, FACS\par Assistant Professor of Surgery and Otolaryngology\par [facility]\par  [DrElla  ___] : Dr. HOLLOWAY

## 2022-12-05 ENCOUNTER — NON-APPOINTMENT (OUTPATIENT)
Age: 42
End: 2022-12-05

## 2023-05-30 ENCOUNTER — APPOINTMENT (OUTPATIENT)
Dept: SURGERY | Facility: CLINIC | Age: 43
End: 2023-05-30